# Patient Record
Sex: FEMALE | Race: WHITE | NOT HISPANIC OR LATINO | URBAN - METROPOLITAN AREA
[De-identification: names, ages, dates, MRNs, and addresses within clinical notes are randomized per-mention and may not be internally consistent; named-entity substitution may affect disease eponyms.]

---

## 2025-03-11 ENCOUNTER — HOSPITAL ENCOUNTER (OUTPATIENT)
Dept: RADIOLOGY | Facility: CLINIC | Age: 70
Discharge: HOME | End: 2025-03-11
Payer: MEDICARE

## 2025-03-11 ENCOUNTER — OFFICE VISIT (OUTPATIENT)
Dept: URGENT CARE | Age: 70
End: 2025-03-11
Payer: MEDICARE

## 2025-03-11 VITALS
WEIGHT: 115 LBS | BODY MASS INDEX: 21.16 KG/M2 | HEART RATE: 89 BPM | OXYGEN SATURATION: 97 % | TEMPERATURE: 97.8 F | RESPIRATION RATE: 15 BRPM | DIASTOLIC BLOOD PRESSURE: 86 MMHG | SYSTOLIC BLOOD PRESSURE: 138 MMHG | HEIGHT: 62 IN

## 2025-03-11 DIAGNOSIS — M13.162 INFLAMMATION OF JOINT OF LEFT KNEE: Primary | ICD-10-CM

## 2025-03-11 DIAGNOSIS — M25.562 LEFT KNEE PAIN, UNSPECIFIED CHRONICITY: ICD-10-CM

## 2025-03-11 PROBLEM — M19.90 OSTEOARTHRITIS: Status: ACTIVE | Noted: 2025-03-11

## 2025-03-11 PROBLEM — Z85.3 HISTORY OF MALIGNANT NEOPLASM OF BREAST: Status: ACTIVE | Noted: 2022-03-10

## 2025-03-11 PROBLEM — M54.31 SCIATICA OF RIGHT SIDE: Chronic | Status: ACTIVE | Noted: 2018-01-10

## 2025-03-11 PROBLEM — R03.0 ELEVATED BP WITHOUT DIAGNOSIS OF HYPERTENSION: Status: ACTIVE | Noted: 2017-12-07

## 2025-03-11 PROBLEM — E78.2 MIXED HYPERLIPIDEMIA: Chronic | Status: ACTIVE | Noted: 2017-12-07

## 2025-03-11 PROCEDURE — 1159F MED LIST DOCD IN RCRD: CPT | Performed by: FAMILY MEDICINE

## 2025-03-11 PROCEDURE — 73562 X-RAY EXAM OF KNEE 3: CPT | Mod: LT

## 2025-03-11 PROCEDURE — 99213 OFFICE O/P EST LOW 20 MIN: CPT | Performed by: FAMILY MEDICINE

## 2025-03-11 PROCEDURE — 1125F AMNT PAIN NOTED PAIN PRSNT: CPT | Performed by: FAMILY MEDICINE

## 2025-03-11 PROCEDURE — 1160F RVW MEDS BY RX/DR IN RCRD: CPT | Performed by: FAMILY MEDICINE

## 2025-03-11 PROCEDURE — 3008F BODY MASS INDEX DOCD: CPT | Performed by: FAMILY MEDICINE

## 2025-03-11 PROCEDURE — 1036F TOBACCO NON-USER: CPT | Performed by: FAMILY MEDICINE

## 2025-03-11 RX ORDER — ROSUVASTATIN CALCIUM 5 MG/1
1 TABLET, COATED ORAL NIGHTLY
COMMUNITY
Start: 2024-01-04

## 2025-03-11 RX ORDER — SULFASALAZINE 500 MG/1
1000 TABLET, DELAYED RELEASE ORAL 2 TIMES DAILY
COMMUNITY
Start: 2024-11-04

## 2025-03-11 RX ORDER — PREDNISONE 20 MG/1
40 TABLET ORAL DAILY
Qty: 10 TABLET | Refills: 0 | Status: SHIPPED | OUTPATIENT
Start: 2025-03-11 | End: 2025-03-16

## 2025-03-11 RX ORDER — ANASTROZOLE 1 MG/1
1 TABLET ORAL
COMMUNITY
Start: 2024-08-14

## 2025-03-11 RX ORDER — ERGOCALCIFEROL 1.25 MG/1
50000 CAPSULE ORAL WEEKLY
COMMUNITY
Start: 2025-01-08 | End: 2025-07-07

## 2025-03-11 ASSESSMENT — PATIENT HEALTH QUESTIONNAIRE - PHQ9
1. LITTLE INTEREST OR PLEASURE IN DOING THINGS: NOT AT ALL
2. FEELING DOWN, DEPRESSED OR HOPELESS: NOT AT ALL
SUM OF ALL RESPONSES TO PHQ9 QUESTIONS 1 AND 2: 0

## 2025-03-11 ASSESSMENT — PAIN SCALES - GENERAL: PAINLEVEL_OUTOF10: 5

## 2025-03-11 NOTE — PROGRESS NOTES
"Subjective   Patient ID: Carol Garcia is a 69 y.o. female. They present today with a chief complaint of Knee Pain (LT knee intermittent x 2 mths but recently getting worse in last few weeks).    History of Present Illness  HPI    As noted above.  Started about 2 months ago and seems to be getting worse, unknown cause, denies injury.  She has no prior issues with her left knee and no prior surgical intervention for it.  Patient states the pain is worse when she is sitting with her legs crossed, when she is just getting started with walking however it improves as she gets going although pain is persistent.  It is also better when she is sitting and resting.  She has a sensation of knee instability but no locking sensation.  No constitutional symptoms.  She took naproxen for her symptoms.  Relevant past medical history reviewed.  She denies hypertension and diabetes.  She has no untoward reactions to steroids.  Her ulcerative colitis is stable.    Past Medical History  Allergies as of 03/11/2025    (No Known Allergies)       (Not in a hospital admission)       History reviewed. No pertinent past medical history.    History reviewed. No pertinent surgical history.     reports that she has never smoked. She has never been exposed to tobacco smoke. She has never used smokeless tobacco. She reports that she does not currently use drugs. She reports that she does not drink alcohol.    Review of Systems  Review of Systems                               Objective    Vitals:    03/11/25 0809   BP: 138/86   Pulse: 89   Resp: 15   Temp: 36.6 °C (97.8 °F)   TempSrc: Oral   SpO2: 97%   Weight: 52.2 kg (115 lb)   Height: 1.575 m (5' 2\")     No LMP recorded. Patient is postmenopausal.    Physical Exam    Constitutional: Vital signs reviewed. Patient alert and without distress.    Cardiovascular: Left femoral pulse normal. No peripheral edema.  No calf swelling or tenderness.      Pulmonary: No respiratory distress.     Skin: Slightly " warm left knee joint compared to the opposite side, no redness noted. No lacerations, abrasions, bruises or lesions noted.     Neurologic: Cortical function: Normal. Sensation: Normal. Motor: Normal. Coordination: Normal.    Musculoskeletal: LLE    Knee medial, lateral and posterior joint lines intact, no tenderness.  Patella and patellar tendon intact, no tenderness. Negative anterior/posterior drawer test. Negative Rose test.  Normal knee ROM.  Mild joint effusion noted.    Tib-fib, calf muscle, Achilles tendon intact. Medial and lateral malleoli intact.         Procedures    Point of Care Test & Imaging Results from this visit  No results found for this visit on 03/11/25.   XR knee left 3 views    Result Date: 3/11/2025  Interpreted By:  Zay López, STUDY: XR KNEE LEFT 3 VIEWS   INDICATION: Signs/Symptoms:Knee pain.   COMPARISON: None   ACCESSION NUMBER(S): [id number]   ORDERING CLINICIAN: SHANTELL GAMBLE   FINDINGS: Mild tricompartmental osteoarthritis left knee. No evidence of fracture or malalignment.       Mild osteoarthritis left knee.   Signed by: Zay López 3/11/2025 8:36 AM Dictation workstation:   [id number]     Diagnostic study results (if any) were reviewed by Shantell Gamble MD.    Assessment/Plan   Allergies, medications, history, and pertinent labs/EKGs/Imaging reviewed by Shantell Gamble MD.     Medical Decision Making  Most likely osteoarthritis although patient has sensation of knee instability, not elicited on clinical exam.  She has responded well to oral steroids in the past so we will try this.  Discussed minimizing NSAID use based on age and other comorbidities.  She may take acetaminophen on the side.  Supportive management, mobility, exercises, etc. discussed.  No need for splints or wraps.  She will follow-up with orthopedics if not improved.  Patient expressed understanding and agreed with this plan.    Orders and Diagnoses  Diagnoses and all  orders for this visit:  Inflammation of joint of left knee  -     predniSONE (Deltasone) 20 mg tablet; Take 2 tablets (40 mg) by mouth once daily for 5 days.  Left knee pain, unspecified chronicity  -     XR knee left 3 views; Future      Medical Admin Record      Patient disposition: Home    Electronically signed by Shantell Gamble MD  9:23 AM

## 2025-03-11 NOTE — PATIENT INSTRUCTIONS
Go to the emergency department for severe symptoms.    Follow-up with your orthopedist if not improved.    OK to apply heat on/off as needed for comfort. Elevate the affected area on/off as needed for comfort.    Acetaminophen 500 mg (Extra StrengthTylenol), 2 tablets every 6 hours as needed for fever or pain.    No Ibuprofen (Advil or Motrin) or Naproxen (Aleve) while on prednisone.  Also avoid unless absolutely necessary for the future as discussed.